# Patient Record
Sex: FEMALE | Race: WHITE | Employment: OTHER | URBAN - METROPOLITAN AREA
[De-identification: names, ages, dates, MRNs, and addresses within clinical notes are randomized per-mention and may not be internally consistent; named-entity substitution may affect disease eponyms.]

---

## 2021-11-05 ENCOUNTER — HOSPITAL ENCOUNTER (OUTPATIENT)
Dept: PHYSICAL THERAPY | Age: 31
Discharge: HOME | End: 2021-11-05
Payer: COMMERCIAL

## 2021-11-05 PROCEDURE — 97535 SELF CARE MNGMENT TRAINING: CPT

## 2021-11-05 PROCEDURE — 97161 PT EVAL LOW COMPLEX 20 MIN: CPT

## 2021-11-05 PROCEDURE — 97110 THERAPEUTIC EXERCISES: CPT

## 2021-11-05 NOTE — PROGRESS NOTES
[facility]  [address]  [place], [address]  [phone] fax    Plan of Care/ Statement of Necessity for Physical Therapy Services    Patient name: Lebron Jimenez Start of Care: 2021   Referral source: Eveline Irene MD : 1990    Medical Diagnosis: Cervicalgia [M54.2]  Payor: [facility] / Plan: [facility] PPO / Product Type: PPO /  Onset Date:2-3 months exacerbation; 1-2 year initial    Treatment Diagnosis: Neck pain   Prior Hospitalization: see medical history Provider#: [id number]   Medications: Verified on Patient summary List    Comorbidities: none reported   Prior Level of Function: Pt with improved ADL ease prior to past 2-3 months     The Plan of Care and following information is based on the information from the initial evaluation. Assessment/ key information: The pt is a 28 y/o F presenting with c/o left sided neck and scapular pain. She reports 1-2 year onset that improved some but was exacerbated in the past 2-3 months after the birth of her child. She reports tightness and pain that will refer from scapular region into lower cervical region. Some tingling reported but no numbness or peripheral symptoms. Negative cervical radicular screening. Good overall shoulder strength without pain, scapular strength limited into more elevated positions. Decreased segmental mobility upper thoracic spine T4-6 area. TrP noted through left cervical musculature into levator scap and thoracic paraspinals compared to right. Good overall cervical mobility noted with some mild limitation into right rotation. Noted resting posture depressed in chair with rounded shoulders and capital flexion. Signs and symptoms appear consistent with mechanical cervicothoracic pain. Pt would benefit from PT to improve ROM, pain and muscular endurance to return to PLOF.     Evaluation Complexity History LOW Complexity : Zero comorbidities / personal factors that will impact the outcome / POC; Examination MEDIUM Complexity : 3 Standardized tests and measures addressing body structure, function, activity limitation and / or participation in recreation  ;Presentation LOW Complexity : Stable, uncomplicated  ;Clinical Decision Making MEDIUM Complexity : FOTO score of 26-74  Overall Complexity Rating: LOW   Problem List: pain affecting function, decrease ROM, decrease strength, decrease ADL/ functional abilitiies, decrease activity tolerance and decrease flexibility/ joint mobility   Treatment Plan may include any combination of the following: Therapeutic exercise, Therapeutic activities, Neuromuscular re-education, Physical agent/modality, Manual therapy, Patient education, Self Care training and Functional mobility training  Patient / Family readiness to learn indicated by: asking questions, trying to perform skills and interest  Persons(s) to be included in education: patient (P)  Barriers to Learning/Limitations: None  Patient Goal (s): Pain reduction  Patient Self Reported Health Status: good  Rehabilitation Potential: good    Short Term Goals: To be accomplished in 2 weeks:  1. Pt will demonstrate I and compliance with HEP to maximize therapeutic effect. 2. Pt will demonstrate right c/s rotation to 55 deg for improved ease of ADLs. Long Term Goals: To be accomplished in 4 weeks:  1. Pt will demonstrate 4+/5 MT and LT strength for improved postural stability with childcare. 2. Pt will demonstrate cervical nod and lift for 30\" without pain or compensation to improve cervical endurance with work duties. 3. Pt will demonstrate QP SA with UE flexion to WNL on left without pain or compensation to improve scapular mechanics with ADLs. 4. Pt will report 50% improvement in symptoms to improve quality of life. Frequency / Duration: Patient to be seen 2 times per week for 4 weeks.     Patient/ Caregiver education and instruction: Diagnosis, prognosis, self care, activity modification and exercises   [x]  Plan of care has been reviewed with MANUEL Landa DPT CMTPT 11/5/2021 5:21 PM    ________________________________________________________________________    I certify that the above Therapy Services are being furnished while the patient is under my care. I agree with the treatment plan and certify that this therapy is necessary.     [de-identified] Signature:____________Date:_________TIME:________     Ruma Leo MD  ** Signature, Date and Time must be completed for valid certification **    Please sign and return to [facility] [address]  [address] [facility] [address]  [place], [address]  [phone] fax

## 2021-11-05 NOTE — PROGRESS NOTES
PT DAILY TREATMENT NOTE     Patient Name: Marne Dancer  Date:2021  : 1990  [x]  Patient  Verified  Payor: [employer] / Plan: [facility] PPO / Product Type: PPO /    In time:4:31  Out time:5:12  Total Treatment Time (min): 41  Visit #: 1 of 8    Medicare/BCBS Only   Total Timed Codes (min):  23 1:1 Treatment Time:  41       Treatment Area: Cervicalgia [M54.2]    SUBJECTIVE  Pain Level (0-10 scale): 3  Any medication changes, allergies to medications, adverse drug reactions, diagnosis change, or new procedure performed?: [x] No    [] Yes (see summary sheet for update)  Subjective functional status/changes:   [] No changes reported  The pt reports rather constant neck and upper quadrant pain on left    OBJECTIVE    18 min [x]Eval                  []Re-Eval       10 min Therapeutic Exercise:  [] See flow sheet :   Rationale: increase ROM, improve coordination and increase proprioception to improve the patients ability to perform ADLs    13 min Therapeutic Activity:  []  See flow sheet :   Rationale: pt education and discussion  to improve the patients ability to self manage symptoms and maximize therapeutic effect         With   [] TE   [] TA   [] neuro   [] other: Patient Education: [x] Review HEP    [] Progressed/Changed HEP based on:   [] positioning   [] body mechanics   [] transfers   [] heat/ice application    [] other:      Other Objective/Functional Measures:      Pain Level (0-10 scale) post treatment: 3-4    ASSESSMENT/Changes in Function: see POC    Patient will continue to benefit from skilled PT services to modify and progress therapeutic interventions, address functional mobility deficits, address ROM deficits, address strength deficits, analyze and address soft tissue restrictions, analyze and cue movement patterns, analyze and modify body mechanics/ergonomics and assess and modify postural abnormalities to attain remaining goals.      [x]  See Plan of Care  [] See progress note/recertification  []  See Discharge Summary         Progress towards goals / Updated goals:  Short Term Goals: To be accomplished in 2 weeks:  1. Pt will demonstrate I and compliance with HEP to maximize therapeutic effect. 2. Pt will demonstrate right c/s rotation to 55 deg for improved ease of ADLs. Long Term Goals: To be accomplished in 4 weeks:  1. Pt will demonstrate 4+/5 MT and LT strength for improved postural stability with childcare. 2. Pt will demonstrate cervical nod and lift for 30\" without pain or compensation to improve cervical endurance with work duties. 3. Pt will demonstrate QP SA with UE flexion to WNL on left without pain or compensation to improve scapular mechanics with ADLs. 4. Pt will report 50% improvement in symptoms to improve quality of life. PLAN  []  Upgrade activities as tolerated     [x]  Continue plan of care  []  Update interventions per flow sheet       []  Discharge due to:_  []  Other:_      Mony Moreno DPT CMTPT 11/5/2021  5:35 PM    No future appointments.

## 2021-11-12 NOTE — PROGRESS NOTES
[facility]  [address]  [place], [address]  [phone] fax    Physical Therapy Discharge Summary  Patient name: Tracy Kaye Start of Care: 2021   Referral source: Mitch Tam MD : 1990                Medical Diagnosis: Cervicalgia [M54.2]  Payor: [employer] [place] / Plan: [facility] PPO / Product Type: PPO /  Onset Date:2-3 months exacerbation; 1-2 year initial                Treatment Diagnosis: Neck pain   Prior Hospitalization: see medical history Provider#: [id number]   Medications: Verified on Patient summary List    Comorbidities: none reported   Prior Level of Function: Pt with improved ADL ease prior to past 2-3 months  Visits from Start of Care: 1    Missed Visits: 0  Reporting Period : 2021 to 2021      Summary of Care:  Short Term Goals: To be accomplished in 2 weeks:  1. Pt will demonstrate I and compliance with HEP to maximize therapeutic effect. 2. Pt will demonstrate right c/s rotation to 55 deg for improved ease of ADLs. Long Term Goals: To be accomplished in 4 weeks:  1. Pt will demonstrate 4+/5 MT and LT strength for improved postural stability with childcare. 2. Pt will demonstrate cervical nod and lift for 30\" without pain or compensation to improve cervical endurance with work duties. 3. Pt will demonstrate QP SA with UE flexion to WNL on left without pain or compensation to improve scapular mechanics with ADLs. 4. Pt will report 50% improvement in symptoms to improve quality of life. ASSESSMENT/RECOMMENDATIONS: The pt did not complete any f/u sessions after initial evaluation due to co-insurance issues.  Unable to further assess patient or goals    [x]Discontinue therapy: []Patient has reached or is progressing toward set goals      [x]Patient requested D/C secondary to insurance issues      []Due to lack of appreciable progress towards set goals    Mony Moreno DPT CMTPT 11/12/2021 12:32 PM